# Patient Record
Sex: MALE | Race: WHITE | Employment: FULL TIME | URBAN - METROPOLITAN AREA
[De-identification: names, ages, dates, MRNs, and addresses within clinical notes are randomized per-mention and may not be internally consistent; named-entity substitution may affect disease eponyms.]

---

## 2021-03-25 ENCOUNTER — NURSE ONLY (OUTPATIENT)
Dept: PRIMARY CARE CLINIC | Age: 61
End: 2021-03-25

## 2021-03-25 DIAGNOSIS — Z23 HIGH PRIORITY FOR COVID-19 VIRUS VACCINATION: Primary | ICD-10-CM

## 2021-04-21 ENCOUNTER — NURSE ONLY (OUTPATIENT)
Dept: PRIMARY CARE CLINIC | Age: 61
End: 2021-04-21

## 2021-04-21 DIAGNOSIS — Z23 HIGH PRIORITY FOR COVID-19 VIRUS VACCINATION: Primary | ICD-10-CM

## 2022-04-28 ENCOUNTER — OFFICE VISIT (OUTPATIENT)
Dept: ENT CLINIC | Age: 62
End: 2022-04-28
Payer: COMMERCIAL

## 2022-04-28 VITALS
WEIGHT: 187 LBS | HEART RATE: 70 BPM | BODY MASS INDEX: 24 KG/M2 | DIASTOLIC BLOOD PRESSURE: 91 MMHG | HEIGHT: 74 IN | SYSTOLIC BLOOD PRESSURE: 150 MMHG

## 2022-04-28 DIAGNOSIS — H69.83 DYSFUNCTION OF BOTH EUSTACHIAN TUBES: ICD-10-CM

## 2022-04-28 DIAGNOSIS — H66.3X2 CHRONIC SUPPURATIVE OTITIS MEDIA OF LEFT EAR, UNSPECIFIED OTITIS MEDIA LOCATION: ICD-10-CM

## 2022-04-28 DIAGNOSIS — H92.03 OTALGIA OF BOTH EARS: Primary | ICD-10-CM

## 2022-04-28 DIAGNOSIS — H61.22 IMPACTED CERUMEN OF LEFT EAR: ICD-10-CM

## 2022-04-28 PROCEDURE — 92511 NASOPHARYNGOSCOPY: CPT | Performed by: OTOLARYNGOLOGY

## 2022-04-28 PROCEDURE — 69210 REMOVE IMPACTED EAR WAX UNI: CPT | Performed by: OTOLARYNGOLOGY

## 2022-04-28 PROCEDURE — 99204 OFFICE O/P NEW MOD 45 MIN: CPT | Performed by: OTOLARYNGOLOGY

## 2022-04-28 RX ORDER — AMOXICILLIN AND CLAVULANATE POTASSIUM 875; 125 MG/1; MG/1
1 TABLET, FILM COATED ORAL 2 TIMES DAILY
Qty: 14 TABLET | Refills: 0 | Status: SHIPPED | OUTPATIENT
Start: 2022-04-28 | End: 2022-05-05

## 2022-04-28 NOTE — PROGRESS NOTES
[facility]      Patient Name: Liana Weiss  Medical Record Number:  [id number]  Primary Care Physician:  Cyndi Lomeli MD  Date of Consultation: 4/28/2022    Chief Complaint: Itchy ears        HISTORY OF PRESENT ILLNESS  Jose Luis Grey is a(n) 57 y.o. male who presents for evaluation of itchy ears. Patient says that for several months he has been having a lot of itching of his ears. This is worse on the left than right. Says that he does use some sort of tool in the ears, but claims he does not go deep into the ear canal.  He does not have a significant ear history. He is never had ear surgery. He does think that he has hearing loss. He hears better on the right side. He does have quite a bit of noise exposure. He is also a smoker. He denies any new neck masses. REVIEW OF SYSTEMS  As above    PHYSICAL EXAM  GENERAL: No Acute Distress, Alert and Oriented, no Hoarseness, strong voice  EYES: EOMI, Anti-icteric  HENT:   Head: Normocephalic and atraumatic. Face:  Symmetric, facial nerve intact, no sinus tenderness   ears: See below  Mouth/Oral Cavity:  normal lips, Uvula is midline, no mucosal lesions, no trismus, poor dentition  Oropharynx/Larynx:  normal oropharynx  Nose:Normal external nasal appearance. Anterior rhinoscopy shows a fairly severe leftward septal deviation  NECK: Normal range of motion, no thyromegaly, trachea is midline, no lymphadenopathy, no neck masses, no crepitus        PROCEDURE  Bilateral ear exam and cerumen removal  The right ears visualized binoculars scope. He has a little bit of dry skin in the ear canal.  His tympanic membrane is very retracted. It appears to be adherent to the incudostapedial joint. Middle ear does appear to be aerated otherwise. On the left side the patient had a fairly dense cerumen impaction I removed with a Mcgraw suction and alligator forcep.   The underlying tympanic membrane appeared to be intact, but there was mucoid or mucopurulent middle ear effusion    Nasopharyngoscopy  Afrin and lidocaine were applied the right nasal cavity. A flexible scope was passed into the nasopharynx. There was a bit of adenoid tissue that appeared to be normal.  Both eustachian tubes appear to be normal.    ASSESSMENT/PLAN  1. Otalgia of both ears  Patient actually does not have amry pain of his ears, but just itching. I think part of this is secondary to using the instrument in his ear. He also had some cerumen impaction on the left. Also he has clear eustachian tube dysfunction and possibly a chronic otitis media on the left side. All this could be causing itching. 2. Chronic suppurative otitis media of left ear, unspecified otitis media location  The patient really has minimal complaints of this other than hearing loss and the itching. I would give him a round of Augmentin to see if it helps. This appears to be chronic as he minimizes the symptoms and says that he has had hearing loss for a long time. I did a nasopharyngoscopy given that he is a smoker and I do not see any clear evidence of a nasopharyngeal mass. He is going to follow-up in a few weeks with a hearing test.  Given the appearance of his ears if he has a significant conductive hearing loss they may be surgically correctable. 3. Dysfunction of both eustachian tubes  His ears appear like someone who has severe eustachian tube dysfunction. Given that he has minimal symptoms I suspect this is probably been a lifelong issue. It does appear that it is bad enough that is causing hearing loss at this time. Will evaluate with a hearing test after the trial of Augmentin. 4. Impacted cerumen of left ear  Removed today in clinic             I have performed a head and neck physical exam personally or was physically present during the key or critical portions of the service.     This note was generated completely or in part utilizing Dragon dictation speech recognition software. Occasionally, words are mistranscribed and despite editing, the text may contain inaccuracies due to incorrect word recognition. If further clarification is needed please contact the office at [phone].

## 2022-05-17 DIAGNOSIS — H91.90 HEARING LOSS, UNSPECIFIED HEARING LOSS TYPE, UNSPECIFIED LATERALITY: Primary | ICD-10-CM
